# Patient Record
Sex: FEMALE | Race: BLACK OR AFRICAN AMERICAN | NOT HISPANIC OR LATINO | URBAN - METROPOLITAN AREA
[De-identification: names, ages, dates, MRNs, and addresses within clinical notes are randomized per-mention and may not be internally consistent; named-entity substitution may affect disease eponyms.]

---

## 2017-10-19 ENCOUNTER — TELEPHONE (OUTPATIENT)
Dept: OBSTETRICS AND GYNECOLOGY | Facility: CLINIC | Age: 27
End: 2017-10-19

## 2017-10-19 ENCOUNTER — NURSE TRIAGE (OUTPATIENT)
Dept: ADMINISTRATIVE | Facility: CLINIC | Age: 27
End: 2017-10-19

## 2017-10-19 NOTE — TELEPHONE ENCOUNTER
Reason for Disposition   [1] Recent medical visit within 24 hours AND [2] condition/symptoms SAME (unchanged) AND [3] caller has additional questions triager can answer (all triage questions negative)    Protocols used:  RECENT MEDICAL VISIT FOR ILLNESS FOLLOW-UP CALL-SAMSON Hill called scheduling desk to try to schedule appt for [facility] tomorrow as recommended by ED physician. She states she was having a lot of vaginal bleeding and contraction type pain and ED physician spoke to Dr. Car who advised to have patient come to clinic tomorrow to have have contents of pregnancy removed since patient is miscarrying. She denies fever, and she is still able to walk without feeling like she has to hold onto anything for support or as if she may pass out but she does report feeling weaker than usual. She is changing pads atleast once per hour and it has been that way since ED visit, she says maybe a little bit better. Advised per protocol if changing pads twice per hour and saturated to go to ED for evaluation or if she feels like she may fall/pass out from being dizzy/lightheaded to go to ED and have another adult to drive. She agrees to plan. Advised she can always call [facility] on call about any new or worsening symptoms and she agrees to plan.

## 2017-10-19 NOTE — TELEPHONE ENCOUNTER
Received a call from the [facility] ER via the transfer line regarding this patient.    26yo F, P-0000, with her first pregnancy.  She was seen in the [facility] ER with complaints of bleeding.  Per the [facility] ER doc, her HCG there was 3200 with no IUP but no ectopic demonstrated on u/s.  She presented today to the [facility] with continued complaints of bleeding.  HCG is 2570 (19.6% over about 24-hrs).  U/S demonstrates some amorphous material in the endometrium, mainly in the SHARYN, most likely an imcomplete Ab.  She is afebrile, and the remainder of her vitals were normal.  Her hematocrit is normal.  She is not bleeding heavily.  On exam, she was about 1-cm dilated.    I recommended d/c home with follow-up with me in clinic at [facility] tomorrow.  Address and phone number for [facility] was given.  Recommended that he give her strict pain, bleeding, and fever precautions.  Recommended NSAIDs for pain control.

## 2017-10-20 ENCOUNTER — TELEPHONE (OUTPATIENT)
Dept: OBSTETRICS AND GYNECOLOGY | Facility: CLINIC | Age: 27
End: 2017-10-20

## 2017-10-20 ENCOUNTER — SURGERY (OUTPATIENT)
Age: 27
End: 2017-10-20

## 2017-10-20 ENCOUNTER — ANESTHESIA EVENT (OUTPATIENT)
Dept: SURGERY | Facility: OTHER | Age: 27
End: 2017-10-20
Payer: MEDICAID

## 2017-10-20 ENCOUNTER — ANESTHESIA (OUTPATIENT)
Dept: SURGERY | Facility: OTHER | Age: 27
End: 2017-10-20
Payer: MEDICAID

## 2017-10-20 ENCOUNTER — HOSPITAL ENCOUNTER (OUTPATIENT)
Facility: OTHER | Age: 27
Discharge: HOME | End: 2017-10-20
Attending: EMERGENCY MEDICINE | Admitting: OBSTETRICS & GYNECOLOGY
Payer: MEDICAID

## 2017-10-20 VITALS
WEIGHT: 169 LBS | BODY MASS INDEX: 28.85 KG/M2 | OXYGEN SATURATION: 98 % | DIASTOLIC BLOOD PRESSURE: 67 MMHG | HEART RATE: 74 BPM | TEMPERATURE: 98 F | SYSTOLIC BLOOD PRESSURE: 111 MMHG | RESPIRATION RATE: 18 BRPM | HEIGHT: 64 IN

## 2017-10-20 DIAGNOSIS — O02.1 MISSED ABORTION: Primary | ICD-10-CM

## 2017-10-20 DIAGNOSIS — Z98.890 S/P DILATION AND CURETTAGE: ICD-10-CM

## 2017-10-20 LAB
ABO + RH BLD: NORMAL
ALBUMIN SERPL BCP-MCNC: 3.7 G/DL
ALP SERPL-CCNC: 50 U/L
ALT SERPL W/O P-5'-P-CCNC: 12 U/L
ANION GAP SERPL CALC-SCNC: 11 MMOL/L
AST SERPL-CCNC: 12 U/L
B-HCG UR QL: POSITIVE
BASOPHILS # BLD AUTO: 0.03 K/UL
BASOPHILS NFR BLD: 0.3 %
BILIRUB SERPL-MCNC: 0.5 MG/DL
BLD GP AB SCN CELLS X3 SERPL QL: NORMAL
BUN SERPL-MCNC: 4 MG/DL
CALCIUM SERPL-MCNC: 9.3 MG/DL
CHLORIDE SERPL-SCNC: 105 MMOL/L
CO2 SERPL-SCNC: 23 MMOL/L
CREAT SERPL-MCNC: 0.6 MG/DL
CTP QC/QA: YES
DIFFERENTIAL METHOD: ABNORMAL
EOSINOPHIL # BLD AUTO: 0.1 K/UL
EOSINOPHIL NFR BLD: 0.5 %
ERYTHROCYTE [DISTWIDTH] IN BLOOD BY AUTOMATED COUNT: 13.6 %
EST. GFR  (AFRICAN AMERICAN): >60 ML/MIN/1.73 M^2
EST. GFR  (NON AFRICAN AMERICAN): >60 ML/MIN/1.73 M^2
GLUCOSE SERPL-MCNC: 79 MG/DL
HCG INTACT+B SERPL-ACNC: 735 MIU/ML
HCT VFR BLD AUTO: 35.6 %
HGB BLD-MCNC: 11.7 G/DL
LYMPHOCYTES # BLD AUTO: 3.8 K/UL
LYMPHOCYTES NFR BLD: 35.8 %
MCH RBC QN AUTO: 27.5 PG
MCHC RBC AUTO-ENTMCNC: 32.9 G/DL
MCV RBC AUTO: 84 FL
MONOCYTES # BLD AUTO: 0.8 K/UL
MONOCYTES NFR BLD: 7.1 %
NEUTROPHILS # BLD AUTO: 6 K/UL
NEUTROPHILS NFR BLD: 55.9 %
PLATELET # BLD AUTO: 407 K/UL
PMV BLD AUTO: 9.1 FL
POTASSIUM SERPL-SCNC: 3.2 MMOL/L
PROT SERPL-MCNC: 7.4 G/DL
RBC # BLD AUTO: 4.25 M/UL
SODIUM SERPL-SCNC: 139 MMOL/L
WBC # BLD AUTO: 10.7 K/UL

## 2017-10-20 PROCEDURE — 80053 COMPREHEN METABOLIC PANEL: CPT

## 2017-10-20 PROCEDURE — 59820 CARE OF MISCARRIAGE: CPT | Mod: ,,, | Performed by: OBSTETRICS & GYNECOLOGY

## 2017-10-20 PROCEDURE — 37000008 HC ANESTHESIA 1ST 15 MINUTES: Performed by: OBSTETRICS & GYNECOLOGY

## 2017-10-20 PROCEDURE — 81025 URINE PREGNANCY TEST: CPT | Performed by: PHYSICIAN ASSISTANT

## 2017-10-20 PROCEDURE — 88305 TISSUE EXAM BY PATHOLOGIST: CPT | Performed by: PATHOLOGY

## 2017-10-20 PROCEDURE — 25000003 PHARM REV CODE 250: Performed by: PHYSICIAN ASSISTANT

## 2017-10-20 PROCEDURE — 84702 CHORIONIC GONADOTROPIN TEST: CPT

## 2017-10-20 PROCEDURE — 25000003 PHARM REV CODE 250: Performed by: NURSE ANESTHETIST, CERTIFIED REGISTERED

## 2017-10-20 PROCEDURE — 86900 BLOOD TYPING SEROLOGIC ABO: CPT

## 2017-10-20 PROCEDURE — 71000015 HC POSTOP RECOV 1ST HR: Performed by: OBSTETRICS & GYNECOLOGY

## 2017-10-20 PROCEDURE — 71000039 HC RECOVERY, EACH ADD'L HOUR: Performed by: OBSTETRICS & GYNECOLOGY

## 2017-10-20 PROCEDURE — 37000009 HC ANESTHESIA EA ADD 15 MINS: Performed by: OBSTETRICS & GYNECOLOGY

## 2017-10-20 PROCEDURE — 63600175 PHARM REV CODE 636 W HCPCS: Performed by: NURSE ANESTHETIST, CERTIFIED REGISTERED

## 2017-10-20 PROCEDURE — 71000033 HC RECOVERY, INTIAL HOUR: Performed by: OBSTETRICS & GYNECOLOGY

## 2017-10-20 PROCEDURE — 86901 BLOOD TYPING SEROLOGIC RH(D): CPT

## 2017-10-20 PROCEDURE — G0378 HOSPITAL OBSERVATION PER HR: HCPCS

## 2017-10-20 PROCEDURE — 36000704 HC OR TIME LEV I 1ST 15 MIN: Performed by: OBSTETRICS & GYNECOLOGY

## 2017-10-20 PROCEDURE — 71000016 HC POSTOP RECOV ADDL HR: Performed by: OBSTETRICS & GYNECOLOGY

## 2017-10-20 PROCEDURE — 25000003 PHARM REV CODE 250: Performed by: STUDENT IN AN ORGANIZED HEALTH CARE EDUCATION/TRAINING PROGRAM

## 2017-10-20 PROCEDURE — 36000705 HC OR TIME LEV I EA ADD 15 MIN: Performed by: OBSTETRICS & GYNECOLOGY

## 2017-10-20 PROCEDURE — 85025 COMPLETE CBC W/AUTO DIFF WBC: CPT

## 2017-10-20 PROCEDURE — 99285 EMERGENCY DEPT VISIT HI MDM: CPT | Mod: 25

## 2017-10-20 PROCEDURE — 88305 TISSUE EXAM BY PATHOLOGIST: CPT | Mod: 26,,, | Performed by: PATHOLOGY

## 2017-10-20 RX ORDER — ONDANSETRON HYDROCHLORIDE 2 MG/ML
INJECTION, SOLUTION INTRAMUSCULAR; INTRAVENOUS
Status: DISCONTINUED | OUTPATIENT
Start: 2017-10-20 | End: 2017-10-20

## 2017-10-20 RX ORDER — DOXYCYCLINE HYCLATE 100 MG
100 TABLET ORAL
Status: DISCONTINUED | OUTPATIENT
Start: 2017-10-20 | End: 2017-10-20 | Stop reason: HOSPADM

## 2017-10-20 RX ORDER — PROPOFOL 10 MG/ML
VIAL (ML) INTRAVENOUS
Status: DISCONTINUED | OUTPATIENT
Start: 2017-10-20 | End: 2017-10-20

## 2017-10-20 RX ORDER — ONDANSETRON 8 MG/1
8 TABLET, ORALLY DISINTEGRATING ORAL EVERY 8 HOURS PRN
Status: DISCONTINUED | OUTPATIENT
Start: 2017-10-20 | End: 2017-10-20 | Stop reason: HOSPADM

## 2017-10-20 RX ORDER — ACETAMINOPHEN 10 MG/ML
INJECTION, SOLUTION INTRAVENOUS
Status: DISCONTINUED | OUTPATIENT
Start: 2017-10-20 | End: 2017-10-20

## 2017-10-20 RX ORDER — DEXAMETHASONE SODIUM PHOSPHATE 4 MG/ML
INJECTION, SOLUTION INTRA-ARTICULAR; INTRALESIONAL; INTRAMUSCULAR; INTRAVENOUS; SOFT TISSUE
Status: DISCONTINUED | OUTPATIENT
Start: 2017-10-20 | End: 2017-10-20

## 2017-10-20 RX ORDER — DIPHENHYDRAMINE HCL 25 MG
25 CAPSULE ORAL EVERY 4 HOURS PRN
Status: DISCONTINUED | OUTPATIENT
Start: 2017-10-20 | End: 2017-10-20 | Stop reason: HOSPADM

## 2017-10-20 RX ORDER — IBUPROFEN 800 MG/1
800 TABLET ORAL EVERY 8 HOURS PRN
Qty: 30 TABLET | Refills: 0 | Status: SHIPPED | OUTPATIENT
Start: 2017-10-20

## 2017-10-20 RX ORDER — MIDAZOLAM HYDROCHLORIDE 1 MG/ML
INJECTION, SOLUTION INTRAMUSCULAR; INTRAVENOUS
Status: DISCONTINUED | OUTPATIENT
Start: 2017-10-20 | End: 2017-10-20

## 2017-10-20 RX ORDER — MISOPROSTOL 200 UG/1
200 TABLET ORAL 2 TIMES DAILY
Qty: 4 TABLET | Refills: 0 | Status: SHIPPED | OUTPATIENT
Start: 2017-10-20 | End: 2017-10-22

## 2017-10-20 RX ORDER — MEPERIDINE HYDROCHLORIDE 50 MG/ML
12.5 INJECTION INTRAMUSCULAR; INTRAVENOUS; SUBCUTANEOUS ONCE AS NEEDED
Status: DISCONTINUED | OUTPATIENT
Start: 2017-10-20 | End: 2017-10-20 | Stop reason: HOSPADM

## 2017-10-20 RX ORDER — ACETAMINOPHEN 500 MG
1000 TABLET ORAL
Status: COMPLETED | OUTPATIENT
Start: 2017-10-20 | End: 2017-10-20

## 2017-10-20 RX ORDER — OXYCODONE HYDROCHLORIDE 5 MG/1
5 TABLET ORAL
Status: DISCONTINUED | OUTPATIENT
Start: 2017-10-20 | End: 2017-10-20 | Stop reason: HOSPADM

## 2017-10-20 RX ORDER — MUPIROCIN 20 MG/G
OINTMENT TOPICAL
Status: DISCONTINUED | OUTPATIENT
Start: 2017-10-20 | End: 2017-10-20 | Stop reason: HOSPADM

## 2017-10-20 RX ORDER — DOXYCYCLINE HYCLATE 100 MG
100 TABLET ORAL EVERY 12 HOURS
Status: COMPLETED | OUTPATIENT
Start: 2017-10-20 | End: 2017-10-20

## 2017-10-20 RX ORDER — SODIUM CHLORIDE 0.9 % (FLUSH) 0.9 %
3 SYRINGE (ML) INJECTION
Status: DISCONTINUED | OUTPATIENT
Start: 2017-10-20 | End: 2017-10-20 | Stop reason: HOSPADM

## 2017-10-20 RX ORDER — LIDOCAINE HCL/PF 100 MG/5ML
SYRINGE (ML) INTRAVENOUS
Status: DISCONTINUED | OUTPATIENT
Start: 2017-10-20 | End: 2017-10-20

## 2017-10-20 RX ORDER — HYDROCODONE BITARTRATE AND ACETAMINOPHEN 5; 325 MG/1; MG/1
1 TABLET ORAL EVERY 6 HOURS PRN
COMMUNITY

## 2017-10-20 RX ORDER — ONDANSETRON 2 MG/ML
4 INJECTION INTRAMUSCULAR; INTRAVENOUS DAILY PRN
Status: DISCONTINUED | OUTPATIENT
Start: 2017-10-20 | End: 2017-10-20 | Stop reason: HOSPADM

## 2017-10-20 RX ORDER — CEPHALEXIN 500 MG/1
500 CAPSULE ORAL EVERY 6 HOURS
Status: ON HOLD | COMMUNITY
End: 2017-10-20 | Stop reason: HOSPADM

## 2017-10-20 RX ORDER — FENTANYL CITRATE 50 UG/ML
25 INJECTION, SOLUTION INTRAMUSCULAR; INTRAVENOUS EVERY 5 MIN PRN
Status: DISCONTINUED | OUTPATIENT
Start: 2017-10-20 | End: 2017-10-20 | Stop reason: HOSPADM

## 2017-10-20 RX ORDER — FENTANYL CITRATE 50 UG/ML
INJECTION, SOLUTION INTRAMUSCULAR; INTRAVENOUS
Status: DISCONTINUED | OUTPATIENT
Start: 2017-10-20 | End: 2017-10-20

## 2017-10-20 RX ORDER — HYDROCODONE BITARTRATE AND ACETAMINOPHEN 5; 325 MG/1; MG/1
1 TABLET ORAL EVERY 4 HOURS PRN
Status: DISCONTINUED | OUTPATIENT
Start: 2017-10-20 | End: 2017-10-20 | Stop reason: HOSPADM

## 2017-10-20 RX ORDER — DIPHENHYDRAMINE HYDROCHLORIDE 50 MG/ML
25 INJECTION INTRAMUSCULAR; INTRAVENOUS EVERY 4 HOURS PRN
Status: DISCONTINUED | OUTPATIENT
Start: 2017-10-20 | End: 2017-10-20 | Stop reason: HOSPADM

## 2017-10-20 RX ORDER — DOXYCYCLINE 100 MG/1
100 CAPSULE ORAL EVERY 12 HOURS
Qty: 14 CAPSULE | Refills: 1 | Status: SHIPPED | OUTPATIENT
Start: 2017-10-20 | End: 2017-10-27

## 2017-10-20 RX ORDER — HYDROMORPHONE HYDROCHLORIDE 2 MG/ML
0.4 INJECTION, SOLUTION INTRAMUSCULAR; INTRAVENOUS; SUBCUTANEOUS EVERY 5 MIN PRN
Status: DISCONTINUED | OUTPATIENT
Start: 2017-10-20 | End: 2017-10-20 | Stop reason: HOSPADM

## 2017-10-20 RX ORDER — SODIUM CHLORIDE, SODIUM LACTATE, POTASSIUM CHLORIDE, CALCIUM CHLORIDE 600; 310; 30; 20 MG/100ML; MG/100ML; MG/100ML; MG/100ML
INJECTION, SOLUTION INTRAVENOUS CONTINUOUS PRN
Status: DISCONTINUED | OUTPATIENT
Start: 2017-10-20 | End: 2017-10-20

## 2017-10-20 RX ADMIN — CARBOXYMETHYLCELLULOSE SODIUM 2 DROP: 2.5 SOLUTION/ DROPS OPHTHALMIC at 05:10

## 2017-10-20 RX ADMIN — IBUPROFEN 800 MG: 800 INJECTION INTRAVENOUS at 03:10

## 2017-10-20 RX ADMIN — DOXYCYCLINE 100 MG: 100 INJECTION, POWDER, LYOPHILIZED, FOR SOLUTION INTRAVENOUS at 04:10

## 2017-10-20 RX ADMIN — ONDANSETRON 4 MG: 2 INJECTION, SOLUTION INTRAMUSCULAR; INTRAVENOUS at 05:10

## 2017-10-20 RX ADMIN — MIDAZOLAM 2 MG: 1 INJECTION INTRAMUSCULAR; INTRAVENOUS at 03:10

## 2017-10-20 RX ADMIN — DOXYCYCLINE HYCLATE 100 MG: 100 TABLET, COATED ORAL at 07:10

## 2017-10-20 RX ADMIN — DEXAMETHASONE SODIUM PHOSPHATE 8 MG: 4 INJECTION, SOLUTION INTRAMUSCULAR; INTRAVENOUS at 05:10

## 2017-10-20 RX ADMIN — ACETAMINOPHEN 1000 MG: 500 TABLET ORAL at 12:10

## 2017-10-20 RX ADMIN — PROPOFOL 200 MG: 10 INJECTION, EMULSION INTRAVENOUS at 05:10

## 2017-10-20 RX ADMIN — ACETAMINOPHEN 1000 MG: 10 INJECTION, SOLUTION INTRAVENOUS at 05:10

## 2017-10-20 RX ADMIN — SODIUM CHLORIDE, SODIUM LACTATE, POTASSIUM CHLORIDE, AND CALCIUM CHLORIDE: 600; 310; 30; 20 INJECTION, SOLUTION INTRAVENOUS at 02:10

## 2017-10-20 RX ADMIN — FENTANYL CITRATE 100 MCG: 50 INJECTION, SOLUTION INTRAMUSCULAR; INTRAVENOUS at 05:10

## 2017-10-20 RX ADMIN — LIDOCAINE HYDROCHLORIDE 100 MG: 20 INJECTION, SOLUTION INTRAVENOUS at 05:10

## 2017-10-20 NOTE — TRANSFER OF CARE
"Anesthesia Transfer of Care Note    Patient: Sergio Candelario    Procedure(s) Performed: Procedure(s) (LRB):  D & C (SUCTION) (N/A)    Patient location: PACU    Anesthesia Type: general    Transport from OR: Transported from OR on 2-3 L/min O2 by NC with adequate spontaneous ventilation    Post pain: adequate analgesia    Post assessment: no apparent anesthetic complications and tolerated procedure well    Post vital signs: stable    Level of consciousness: awake, alert and oriented    Nausea/Vomiting: no nausea/vomiting    Complications: none    Transfer of care protocol was followed      Last vitals:   Visit Vitals  /64 (BP Location: Right arm, Patient Position: Lying)   Pulse 74   Temp 36.4 °C (97.6 °F) (Oral)   Resp 16   Ht 5' 4" (1.626 m)   Wt 76.7 kg (169 lb)   SpO2 100%   BMI 29.01 kg/m²     "

## 2017-10-20 NOTE — ED TRIAGE NOTES
Pt with miscarriage on 10/18 - was seen at another hospital on 10/19 and told she had retained products - told to follow up at a walk-in clinic - went there and told they were unable to help her so she needed to come to ED for further evaluation and possible D&C

## 2017-10-20 NOTE — SUBJECTIVE & OBJECTIVE
Obstetric History     No data available        Past Medical History:   Diagnosis Date    Endometriosis      History reviewed. No pertinent surgical history.      (Not in a hospital admission)    Review of patient's allergies indicates:  No Known Allergies     Family History     None        Social History Main Topics    Smoking status: Light Tobacco Smoker    Smokeless tobacco: Never Used    Alcohol use Yes    Drug use: Unknown    Sexual activity: Not on file     Review of Systems   Constitutional: Negative for activity change, chills and fever.   Respiratory: Negative for cough and shortness of breath.    Cardiovascular: Negative for leg swelling.   Gastrointestinal: Positive for abdominal pain (cramping). Negative for constipation, diarrhea, nausea and vomiting.   Genitourinary: Positive for vaginal bleeding. Negative for vaginal discharge.   Neurological: Negative for headaches.   Hematological: Does not bruise/bleed easily.      Objective:     Vital Signs (Most Recent):  Temp: 98.3 °F (36.8 °C) (10/20/17 1102)  Pulse: 88 (10/20/17 1214)  Resp: 18 (10/20/17 1214)  BP: 129/84 (10/20/17 1214)  SpO2: 97 % (10/20/17 1214) Vital Signs (24h Range):  Temp:  [98.3 °F (36.8 °C)] 98.3 °F (36.8 °C)  Pulse:  [88] 88  Resp:  [18] 18  SpO2:  [97 %-100 %] 97 %  BP: (129-138)/(82-84) 129/84     Weight: 76.7 kg (169 lb)  Body mass index is 29.01 kg/m².    No LMP recorded.    Physical Exam:   Constitutional: She is oriented to person, place, and time. She appears well-developed and well-nourished. No distress.    HENT:   Head: Normocephalic and atraumatic.      Cardiovascular: Normal rate, regular rhythm and normal heart sounds.     Pulmonary/Chest: Effort normal and breath sounds normal.        Abdominal: Soft. She exhibits no distension and no mass. There is no tenderness (central lower abdomen). There is no rebound and no guarding.     Genitourinary: Uterus normal. Cervix is normal.            Uterus Size: 11 cm    Musculoskeletal: Normal range of motion. She exhibits no edema.       Neurological: She is alert and oriented to person, place, and time.    Skin: Skin is warm and dry.    Psychiatric: She has a normal mood and affect.       Laboratory:  Beta HCG: No results for input(s): HCGPREGUR in the last 48 hours.  CBC:     Recent Labs  Lab 10/20/17  1221   WBC 10.70   RBC 4.25   HGB 11.7*   HCT 35.6*   *   MCV 84   MCH 27.5   MCHC 32.9

## 2017-10-20 NOTE — ED NOTES
AAOx4. States abd pain 8/10. Denies any other discomfort at this time. POC updated. Valuables and belongings sent home with terrie. Report called to JULIA Ceja (surgery holding nurse). Pt transported to holding area via stretcher by transport tech.

## 2017-10-20 NOTE — OP NOTE
OPERATIVE NOTE    DATE OF PROCEDURE: 10/20/2017    SURGEON: Daniel Sellers III, MD    ASSISTANT: Reyna Reyes MD    PREOPERATIVE DIAGNOSIS: missed AB    POSTOPERATIVE DIAGNOSIS: Same.     PROCEDURE: Suction Dilation and curettage.     ANESTHESIA: General    FINDINGS: cervix dilated to roughly 4cm. Large blood clot at cervical os. Removed with ringed forceps. Suction D&C evacuated the uterus. Uterus maintained firmness and hemostasis was noted.      ESTIMATED BLOOD LOSS: 150 mL.      SPECIMEN: products of conception.    INDICATIONS: Ultrasound performed in the ED at OSH confirmed a missed AB.  Patient was counseled extensively on medical and surgical options and opted for surgical management.     PROCEDURE IN DETAIL: After proper consents were explained and obtained, the   patient was taken to the Operating Room where anesthesia was obtained without difficulty. She was then placed in dorsal lithotomy position in Parviz stirrups. The patient was then prepped and draped in normal sterile fashion. A weighted speculum was placed into the vagina. Right angle used to visualize the cervix, which was grasped at the anterior lip with the single tooth tenaculum.  The cervix was already dilated. A large blood clot was removed from the cervical os. Products were then removed with ringed forceps. A 10 mm curved suction curette was introduced into the endometrial cavity and suction was applied to remove the products of conception. Serial sweeps were performed and products of conception were removed. Using a Leisa curette, serial sweeps were performed until a gritty consistency of the uterine lining was felt in all 4 quadrants. A final pass was performed with the suction curette to ensure that all remaining products of conception were removed.       The tenaculum was removed and good hemostasis was noted.  The patient tolerated the procedure well. All counts were correct x2. The patient was taken to Recovery area in stable  itzel VELEZ  PGY1    I was present for and participated in the entire surgical procedure.

## 2017-10-20 NOTE — DISCHARGE SUMMARY
"[facility]  Obstetrics & Gynecology  Discharge Summary    Patient Name: Sergio Candelario  MRN: [id number]  Admission Date: 10/20/2017  Hospital Length of Stay: 0 days  Discharge Date and Time:  10/20/2017 5:57 PM  Attending Physician: Hannah att. providers found   Discharging Provider: Reyna Reyes MD  Primary Care Provider: Primary Doctor Hannah    HPI:  26yo AAF . 2 days ago she states she went to [facility] with vaginal bleeding and was told she was having miscarriage. At that time vaginal bleeding was 2pads/hr per patient. She was discharged with a beta HCG of 3200. The next day she presented to [place] with continued vaginal bleeding and was told she had "products in there" and that "she needed to go to a clinic to get it out". Her beta HCG was 2570 (19% decline in 2 hours). The bleeding continued at 2 pads/hr up until this morning. Patient states she went through a pack of pads in 12 hours. Denies any passage of products. States she passed "like a black thing" possibly blood clots.No tan or grey material passed. This morning bleeding is lighter. Patient took an at home pregnancy test 3 weeks ago which was positive but did not go to the doctor. LMP end of July per patient. She didn't think anything of going 3 months without a period because she states "that happens a lot because of my endometriosis".   She complains of lower abdomen pain that is similar to strong menstrual cramps. Denies any fever, chills, nausea, vomiting, Cp, or SOB. Denies feeling light headed or dizziness. Denies LOC.      Hospital Course:  No notes on file    Procedure(s) (LRB):  D & C (SUCTION) (N/A)     Consults         Status Ordering Provider     Inpatient consult to Obstetrics / Gynecology  Once     Provider:  Daniel Sellers III, MD    Acknowledged OTILIA URBINA          Significant Diagnostic Studies: Labs:   CBC   Recent Labs  Lab 10/20/17  1221   WBC 10.70   HGB 11.7*   HCT 35.6*   * "       Pending Diagnostic Studies:     Procedure Component Value Units Date/Time    US OB Less Than 14 Wks with Transvag(xpd [[id number]] Updated:  10/20/17 3927    Order Status:  Sent Lab Status:  No result         Final Active Diagnoses:    Diagnosis Date Noted POA    PRINCIPAL PROBLEM:  S/P dilation and curettage [Z98.890] 10/20/2017 Not Applicable    Missed  [O02.1] 10/20/2017 Unknown      Problems Resolved During this Admission:    Diagnosis Date Noted Date Resolved POA        Discharged Condition: good    Disposition:     Follow Up:  Follow-up Information     Daniel Sellers Iii, MD. Schedule an appointment as soon as possible for a visit in 4 weeks.    Specialties:  Obstetrics, Obstetrics and Gynecology  Why:  post op visit  Contact information:  [address] [place] [address] [id number] [phone]                 Patient Instructions:     Diet general     Activity as tolerated     Other restrictions (specify):   Order Comments: Pelvic rest for 2 weeks     Call MD for:  temperature >100.4     Call MD for:  persistent nausea and vomiting     Call MD for:  severe uncontrolled pain     Call MD for:  difficulty breathing, headache or visual disturbances     Call MD for:  redness, tenderness, or signs of infection (pain, swelling, redness, odor or green/yellow discharge around incision site)     Call MD for:  persistent dizziness or light-headedness     Call MD for:  extreme fatigue     Call MD for:   Order Comments: Bleeding through 2 pads an hour for 2 hours       Medications:  Reconciled Home Medications:   Current Discharge Medication List      START taking these medications    Details   doxycycline (MONODOX) 100 MG capsule Take 1 capsule (100 mg total) by mouth every 12 (twelve) hours. MAY REFILL ONCE  Qty: 14 capsule, Refills: 1      ibuprofen (ADVIL,MOTRIN) 800 MG tablet Take 1 tablet (800 mg total) by mouth every 8 (eight) hours as needed for Pain.  Qty: 30 tablet, Refills: 0       misoprostol (CYTOTEC) 200 MCG Tab Take 1 tablet (200 mcg total) by mouth 2 (two) times daily.  Qty: 4 tablet, Refills: 0         CONTINUE these medications which have NOT CHANGED    Details   hydrocodone-acetaminophen 5-325mg (NORCO) 5-325 mg per tablet Take 1 tablet by mouth every 6 (six) hours as needed for Pain.         STOP taking these medications       cephALEXin (KEFLEX) 500 MG capsule Comments:   Reason for Stopping:               Reyna Reyes MD  Obstetrics & Gynecology  [facility]      I was present for and participated in the entire surgical procedure.

## 2017-10-20 NOTE — ANESTHESIA PREPROCEDURE EVALUATION
10/20/2017  Sergio Candelario is a 27 y.o., female.    Anesthesia Evaluation    I have reviewed the Patient Summary Reports.    I have reviewed the Nursing Notes.   I have reviewed the Medications.     Review of Systems  Anesthesia Hx:  No problems with previous Anesthesia    Social:  Non-Smoker    Cardiovascular:   Exercise tolerance: good    Pulmonary:  Pulmonary Normal    Hepatic/GI:  Hepatic/GI Normal    Endocrine:  Endocrine Normal        Physical Exam  General:  Obesity    Airway/Jaw/Neck:  Airway Findings: Mouth Opening: Normal Tongue: Normal  General Airway Assessment: Adult  Mallampati: II  TM Distance: Normal, at least 6 cm  Jaw/Neck Findings:     Neck ROM: Normal ROM      Dental:  Dental Findings: In tact             Anesthesia Plan  Type of Anesthesia, risks & benefits discussed:  Anesthesia Type:  general  Patient's Preference:   Intra-op Monitoring Plan:   Intra-op Monitoring Plan Comments:   Post Op Pain Control Plan:   Post Op Pain Control Plan Comments:   Induction:   IV  Beta Blocker:         Informed Consent: Patient understands risks and agrees with Anesthesia plan.  Questions answered. Anesthesia consent signed with patient.  ASA Score: 2     Day of Surgery Review of History & Physical:    H&P update referred to the surgeon.         Ready For Surgery From Anesthesia Perspective.

## 2017-10-20 NOTE — TELEPHONE ENCOUNTER
----- Message from Rhina Barros sent at 10/19/2017  5:28 PM CDT -----  Contact: Self [phone]  Pt is requesting a call back from the nurse to schedule a post hospital visit for a miscarriage.  Pt reports that she was told she must be seen tomorrow as she was told part of the baby is still inside.  Pt does not have insurance and I discussed the $500.00 deposit, but patient reports she does not have that and will need to discuss financial assistance.    Pt may be reached at [phone].    Thank you.  ROSANNA

## 2017-10-20 NOTE — H&P
"[facility]  Obstetrics & Gynecology  Progress Note    Patient Name: Sergio Candelario  MRN: [id number]  Admission Date: 10/20/2017  Primary Care Provider: Primary Doctor No  Principal Problem: Missed     Subjective:     HPI:  28yo AAF . 2 days ago she states she went to [facility] with vaginal bleeding and was told she was having miscarriage. At that time vaginal bleeding was 2pads/hr per patient. She was discharged with a beta HCG of 3200. The next day she presented to [place] with continued vaginal bleeding and was told she had "products in there" and that "she needed to go to a clinic to get it out". Her beta HCG was 2570 (19% decline in 2 hours). The bleeding continued at 2 pads/hr up until this morning. Patient states she went through a pack of pads in 12 hours. Denies any passage of products. States she passed "like a black thing" possibly blood clots.No tan or grey material passed. This morning bleeding is lighter. Patient took an at home pregnancy test 3 weeks ago which was positive but did not go to the doctor. LMP end of July per patient. She didn't think anything of going 3 months without a period because she states "that happens a lot because of my endometriosis".  GA 12wks by LMP. She complains of lower abdomen pain that is similar to strong menstrual cramps. Denies any fever, chills, nausea, vomiting, Cp, or SOB. Denies feeling light headed or dizziness. Denies LOC.          Obstetric History     No data available        Past Medical History:   Diagnosis Date    Endometriosis      History reviewed. No pertinent surgical history.      (Not in a hospital admission)    Review of patient's allergies indicates:  No Known Allergies     Family History     None        Social History Main Topics    Smoking status: Light Tobacco Smoker    Smokeless tobacco: Never Used    Alcohol use Yes    Drug use: Unknown    Sexual activity: Not on file     Review of Systems "   Constitutional: Negative for activity change, chills and fever.   Respiratory: Negative for cough and shortness of breath.    Cardiovascular: Negative for leg swelling.   Gastrointestinal: Positive for abdominal pain (cramping). Negative for constipation, diarrhea, nausea and vomiting.   Genitourinary: Positive for vaginal bleeding. Negative for vaginal discharge.   Neurological: Negative for headaches.   Hematological: Does not bruise/bleed easily.      Objective:     Vital Signs (Most Recent):  Temp: 98.3 °F (36.8 °C) (10/20/17 1102)  Pulse: 88 (10/20/17 1214)  Resp: 18 (10/20/17 1214)  BP: 129/84 (10/20/17 1214)  SpO2: 97 % (10/20/17 1214) Vital Signs (24h Range):  Temp:  [98.3 °F (36.8 °C)] 98.3 °F (36.8 °C)  Pulse:  [88] 88  Resp:  [18] 18  SpO2:  [97 %-100 %] 97 %  BP: (129-138)/(82-84) 129/84     Weight: 76.7 kg (169 lb)  Body mass index is 29.01 kg/m².    No LMP recorded.    Physical Exam:   Constitutional: She is oriented to person, place, and time. She appears well-developed and well-nourished. No distress.    HENT:   Head: Normocephalic and atraumatic.      Cardiovascular: Normal rate, regular rhythm and normal heart sounds.     Pulmonary/Chest: Effort normal and breath sounds normal.        Abdominal: Soft. She exhibits no distension and no mass. There is no tenderness (central lower abdomen). There is no rebound and no guarding.     Genitourinary: Uterus normal. Cervix is normal.            Uterus Size: 11 cm   Musculoskeletal: Normal range of motion. She exhibits no edema.       Neurological: She is alert and oriented to person, place, and time.    Skin: Skin is warm and dry.    Psychiatric: She has a normal mood and affect.       Laboratory:  Beta HC  Recent Labs  Lab 10/20/17  1221   WBC 10.70   RBC 4.25   HGB 11.7*   HCT 35.6*   *   MCV 84   MCH 27.5   MCHC 32.9         Assessment/Plan:     * Missed     - Beta HCG 3500>2570 over past 2 days  - Beta HCG here 735  - likely  missed AB  - speculum exam showed POC at cervical os. Could not remove 2/2 to patient's level of pain. Very uncomfortable during pelvic exam  - No need for repeat US since POC visualized  - H/H 11/35 stable  - VSS   - afebrile, no leukocytosis  - hemodynamically stable  - discussed expectant vs medical vs surgical management, patient wishes to proceed with suction D&C  - Risks and benefits discussed, surgery consent and blood consent signed  - doxycycline 100mg once on call to OR  - will proceed to OR for suction D&C  - discussed patient that we will have to establish OBYGN care and trend beta-HCG levels, will add to beta book             Reyna Reyes MD  Obstetrics & Gynecology  [facility]    I have reviewed the resident's note, and agree with the diagnosis and management plan

## 2017-10-20 NOTE — ANESTHESIA POSTPROCEDURE EVALUATION
"Anesthesia Post Evaluation    Patient: Sergio Candelario    Procedure(s) Performed: Procedure(s) (LRB):  D & C (SUCTION) (N/A)    Final Anesthesia Type: general  Patient location during evaluation: PACU  Patient participation: Yes- Able to Participate  Level of consciousness: oriented and awake  Post-procedure vital signs: reviewed and stable  Pain management: adequate  Airway patency: patent  PONV status at discharge: No PONV  Anesthetic complications: no      Cardiovascular status: hemodynamically stable  Respiratory status: unassisted, spontaneous ventilation and room air  Hydration status: euvolemic  Follow-up not needed.        Visit Vitals  /64 (BP Location: Right arm, Patient Position: Lying)   Pulse 74   Temp 36.4 °C (97.6 °F) (Oral)   Resp 16   Ht 5' 4" (1.626 m)   Wt 76.7 kg (169 lb)   SpO2 100%   BMI 29.01 kg/m²       Pain/Annetta Score: Pain Assessment Performed: Yes (10/20/2017  5:38 PM)  Presence of Pain: denies (10/20/2017  5:38 PM)  Pain Rating Prior to Med Admin: 8 (10/20/2017 12:25 PM)  Annetta Score: 8 (10/20/2017  5:38 PM)      "

## 2017-10-20 NOTE — HPI
"28yo AAF . 2 days ago she states she went to [facility] with vaginal bleeding and was told she was having miscarriage. At that time vaginal bleeding was 2pads/hr per patient. She was discharged with a beta HCG of 3200. The next day she presented to [place] with continued vaginal bleeding and was told she had "products in there" and that "she needed to go to a clinic to get it out". Her beta HCG was 2570 (19% decline in 2 hours). The bleeding continued at 2 pads/hr up until this morning. Patient states she went through a pack of pads in 12 hours. Denies any passage of products. States she passed "like a black thing" possibly blood clots.No tan or grey material passed. This morning bleeding is lighter. Patient took an at home pregnancy test 3 weeks ago which was positive but did not go to the doctor. LMP end of July per patient. She didn't think anything of going 3 months without a period because she states "that happens a lot because of my endometriosis".   She complains of lower abdomen pain that is similar to strong menstrual cramps. Denies any fever, chills, nausea, vomiting, Cp, or SOB. Denies feeling light headed or dizziness. Denies LOC.    "

## 2017-10-20 NOTE — TELEPHONE ENCOUNTER
Pt called earlier trying to get an appt with Dr. Romero-has been cramping and bleeding all night-pt very upset about having bills from three hospitals-Medicaid hasn't gone through yet-pt very tearful and crying about miscarriage-trying to console pt but explained to her that if she is bleeding that much changing 1-2 pads all night-she needs to go to the emergency room-said they went to two emergency rooms last night in [facility] and [place].  Both told her there was nothing they could do and not to come back-that she needed to see Dr. Romero-explained to pt that she needed to go to ER ([facility])-asked pt for anamariamalorie name and number (he had gone to get some money so that she could be seen)so I could call and discuss with him- I called terrie and discussed situation to take pt to [facility]- He confirmed as well they had been to two hospitals overnight and they did nothing to help them and told them not to come back-He said she was bleeding heavily all night- He verbalized understanding and said he will bring her to [facility]

## 2017-10-20 NOTE — TELEPHONE ENCOUNTER
Returned pt call and pt stated she was told to follow up with  for a SAB. Informed pt that she will need to pay a 500 deposit today. Pt stated that she did not have the money to pay. Gave patient the number to financial assistance. Informed pt that if she is having heavy bleeding that she should go to the ER. Pt verbalized understanding.

## 2017-10-20 NOTE — ASSESSMENT & PLAN NOTE
- Beta HCG 3500>2570 over past 2 days  - Beta HCG here 735  - likely missed AB  - speculum exam showed POC at cervical os. Could not remove 2/2 to patient's level of pain. Very uncomfortable during pelvic exam  - No need for repeat US since POC visualized  - H/H 11/35 stable  - VSS   - afebrile, no leukocytosis  - hemodynamically stable  - discussed expectant vs medical vs surgical management, patient wishes to proceed with suction D&C  - Risks and benefits discussed, surgery consent and blood consent signed  - doxycycline 100mg once on call to OR  - will proceed to OR for suction D&C  - discussed patient that we will have to establish [facility] care and trend beta-HCG levels, will add to beta book

## 2017-10-20 NOTE — LETTER
October 20, 2017         [address]  [place] [address]  Phone: [phone]  Fax: [phone]       Patient: Sergio Candelario   YOB: 1990  Date of Visit: 10/20/2017    To Whom It May Concern:    Da Candelario  was at [facility] on 10/20/2017. {HE SHE CAPITAL LETTER:18507} may return to work/school on *** {With/no:26582} restrictions. If you have any questions or concerns, or if I can be of further assistance, please do not hesitate to contact me.    Sincerely,    Rajni Lehman RN

## 2017-10-20 NOTE — ED PROVIDER NOTES
"Encounter Date: 10/20/2017       History     Chief Complaint   Patient presents with    Female  Problem     pt reports having miscarriage 2 days ago; reports still having lots of heavy bleeding and was told by OB-GYN to come to ED for further treatment; pt reports with c/o abdominal pain, last night went through 6 pads within 2-3 hours      27-year-old female with history of endometriosis presents the emergency department with complaints of vaginal bleeding and pregnancy.  She states that she's been having heavy bleeding for the last 2 days.  She admits that she was seen at 2 outside facility ERs and worked up for vaginal bleeding in pregnancy.  She states that she was told "there was nothing they could do."  She requests that we "stop the bleeding."  This is her first pregnancy.  She reports associated abdominal cramping that is an 8 out of 10. She states that she was advised to come here by GYN for further workup and treatment.  She reports heavy bleeding last night but states that today it is more consistent with "heavy period."      The history is provided by the patient and a significant other.     Review of patient's allergies indicates:  No Known Allergies  Past Medical History:   Diagnosis Date    Endometriosis      History reviewed. No pertinent surgical history.  History reviewed. No pertinent family history.  Social History   Substance Use Topics    Smoking status: Light Tobacco Smoker    Smokeless tobacco: Never Used    Alcohol use Yes     Review of Systems   Constitutional: Negative for chills and fever.   HENT: Negative for sore throat.    Respiratory: Negative for shortness of breath.    Cardiovascular: Negative for chest pain.   Gastrointestinal: Positive for abdominal pain. Negative for diarrhea, nausea and vomiting.   Genitourinary: Positive for vaginal bleeding. Negative for difficulty urinating, dysuria, flank pain, frequency, hematuria and urgency.   Musculoskeletal: Negative for back " pain.   Skin: Negative for rash.   Neurological: Negative for weakness.   Hematological: Does not bruise/bleed easily.       Physical Exam     Initial Vitals [10/20/17 1102]   BP Pulse Resp Temp SpO2   138/82 88 18 98.3 °F (36.8 °C) 100 %      MAP       100.67         Physical Exam    Nursing note and vitals reviewed.  Constitutional: Vital signs are normal. She appears well-developed and well-nourished. She is not diaphoretic.  Non-toxic appearance. No distress.   HENT:   Head: Normocephalic and atraumatic.   Right Ear: External ear normal.   Left Ear: External ear normal.   Nose: Nose normal.   Mouth/Throat: Oropharynx is clear and moist.   Eyes: Conjunctivae, EOM and lids are normal. Pupils are equal, round, and reactive to light. No scleral icterus.   Neck: Normal range of motion and phonation normal. Neck supple.   Cardiovascular: Normal rate, regular rhythm and normal heart sounds. Exam reveals no gallop and no friction rub.    No murmur heard.  Abdominal: Soft. Normal appearance and bowel sounds are normal. She exhibits no distension. There is tenderness in the suprapubic area. There is no rigidity, no rebound, no guarding, no CVA tenderness, no tenderness at McBurney's point and negative Negro's sign.   Genitourinary:   Genitourinary Comments: Deferred to GYN   Musculoskeletal: Normal range of motion.   No obvious deformities, moving all extremities, normal gait   Neurological: She is alert and oriented to person, place, and time. She has normal strength and normal reflexes. No sensory deficit.   Skin: Skin is warm, dry and intact. No lesion and no rash noted. No erythema.   Psychiatric: She has a normal mood and affect. Her speech is normal and behavior is normal. Judgment normal. Cognition and memory are normal.         ED Course   Procedures  Labs Reviewed   CBC W/ AUTO DIFFERENTIAL - Abnormal; Notable for the following:        Result Value    Hemoglobin 11.7 (*)     Hematocrit 35.6 (*)     Platelets 407  (*)     MPV 9.1 (*)     All other components within normal limits   COMPREHENSIVE METABOLIC PANEL - Abnormal; Notable for the following:     Potassium 3.2 (*)     BUN, Bld 4 (*)     Alkaline Phosphatase 50 (*)     All other components within normal limits   POCT URINE PREGNANCY - Abnormal; Notable for the following:     POC Preg Test, Ur Positive (*)     All other components within normal limits   HCG, QUANTITATIVE, PREGNANCY   TYPE & SCREEN         Imaging Results          US OB Less Than 14 Wks with Transvag(xpd (In process)                      Medical Decision Making:   History:   I obtained history from: someone other than patient.       <> Summary of History: terrie  Old Medical Records: I decided to obtain old medical records.  Initial Assessment:   27-year-old female with complaints consistent with missed .  Vital signs stable, afebrile, neurovascularly intact.  She is alert, healthy and nontoxic appearing.  She is tearful on exam.  She does have some suprapubic tenderness palpation on exam.  Clinical Tests:   Lab Tests: Ordered and Reviewed  Radiological Study: Ordered and Reviewed  ED Management:  2 days ago her beta HCG was 3248 at outside facility. Beta HCG yesterday was 2500. She does report passing some type of clot/product last night.  Workup obtained to further assess nature patient's bleeding.  No elevation in white blood cell count and H&H stable at 11.7 and 35.6.  11:37 AM GYN paged. 11:50 AM discussed with GYN. Will obtain US and labs.  Evaluated by OB/GYN, Dr. Reyes.  She states that products are visualized on this.  Actively passing products of conception however patient requesting D&C.  Plan to take patient to or for D&C per OB/GYN  Other:   I have discussed this case with another health care provider.       <> Summary of the Discussion: Tyra, attending ED physician  This note was created using Dragon Medical dictation.  There may be typographical errors secondary to  dictation.                     ED Course      Clinical Impression:     1. Missed         Disposition:   Disposition: Placed in Observation  Condition: Sergey Brooks PA-C  10/20/17 1257

## 2017-10-20 NOTE — TELEPHONE ENCOUNTER
----- Message from Essence Stapleton sent at 10/20/2017  8:53 AM CDT -----  Contact: PT  _  1st Request  _  2nd Request  _  3rd Request      Who:pt    Why: financial services wont help her unless she has an appointment,pt said she would like a call back ASAP    What Number to Call Back: [phone]    When to Expect a call back: (Before the end of the day)   -- if call after 3:00 call back will be tomorrow.

## 2017-10-21 NOTE — PLAN OF CARE
Sergio Candelario has met all discharge criteria from Phase II. Vital Signs are stable, ambulating  without difficulty. Discharge instructions given, patient verbalized understanding. Discharged from facility via wheelchair in stable condition.

## 2017-10-21 NOTE — DISCHARGE INSTRUCTIONS

## 2017-10-26 ENCOUNTER — TELEPHONE (OUTPATIENT)
Dept: OBSTETRICS AND GYNECOLOGY | Facility: HOSPITAL | Age: 27
End: 2017-10-26

## 2017-10-26 NOTE — TELEPHONE ENCOUNTER
Called and left a message regarding beta hcg levels. Stated patient needed to continue to have beta hcg levels drawn weekly. Stated GYN team could be reached by calling the [facility]  and asking for GYN team or calling [phone].

## 2017-10-30 ENCOUNTER — NURSE TRIAGE (OUTPATIENT)
Dept: ADMINISTRATIVE | Facility: CLINIC | Age: 27
End: 2017-10-30

## 2017-10-30 ENCOUNTER — HOSPITAL ENCOUNTER (EMERGENCY)
Facility: OTHER | Age: 27
Discharge: HOME | End: 2017-10-30
Attending: EMERGENCY MEDICINE
Payer: MEDICAID

## 2017-10-30 VITALS
RESPIRATION RATE: 18 BRPM | WEIGHT: 169 LBS | HEART RATE: 80 BPM | OXYGEN SATURATION: 98 % | BODY MASS INDEX: 28.85 KG/M2 | TEMPERATURE: 98 F | DIASTOLIC BLOOD PRESSURE: 80 MMHG | HEIGHT: 64 IN | SYSTOLIC BLOOD PRESSURE: 118 MMHG

## 2017-10-30 DIAGNOSIS — N93.9 VAGINAL BLEEDING: Primary | ICD-10-CM

## 2017-10-30 DIAGNOSIS — R10.2 PELVIC PAIN: ICD-10-CM

## 2017-10-30 LAB
ANION GAP SERPL CALC-SCNC: 7 MMOL/L
ANISOCYTOSIS BLD QL SMEAR: SLIGHT
B-HCG UR QL: NEGATIVE
BACTERIA #/AREA URNS HPF: ABNORMAL /HPF
BACTERIA #/AREA URNS HPF: NORMAL /HPF
BACTERIA GENITAL QL WET PREP: ABNORMAL
BASOPHILS NFR BLD: 0 %
BILIRUB UR QL STRIP: NEGATIVE
BILIRUB UR QL STRIP: NEGATIVE
BUN SERPL-MCNC: 9 MG/DL
CALCIUM SERPL-MCNC: 9.4 MG/DL
CHLORIDE SERPL-SCNC: 108 MMOL/L
CLARITY UR: ABNORMAL
CLARITY UR: CLEAR
CLUE CELLS VAG QL WET PREP: ABNORMAL
CO2 SERPL-SCNC: 26 MMOL/L
COLOR UR: YELLOW
COLOR UR: YELLOW
CREAT SERPL-MCNC: 0.7 MG/DL
CTP QC/QA: YES
DIFFERENTIAL METHOD: ABNORMAL
EOSINOPHIL NFR BLD: 0 %
ERYTHROCYTE [DISTWIDTH] IN BLOOD BY AUTOMATED COUNT: 14.6 %
EST. GFR  (AFRICAN AMERICAN): >60 ML/MIN/1.73 M^2
EST. GFR  (NON AFRICAN AMERICAN): >60 ML/MIN/1.73 M^2
FILAMENT FUNGI VAG WET PREP-#/AREA: ABNORMAL
GLUCOSE SERPL-MCNC: 81 MG/DL
GLUCOSE UR QL STRIP: NEGATIVE
GLUCOSE UR QL STRIP: NEGATIVE
HCT VFR BLD AUTO: 36.9 %
HGB BLD-MCNC: 11.7 G/DL
HGB UR QL STRIP: ABNORMAL
HGB UR QL STRIP: ABNORMAL
HYALINE CASTS #/AREA URNS LPF: 0 /LPF
HYALINE CASTS #/AREA URNS LPF: 0 /LPF
KETONES UR QL STRIP: NEGATIVE
KETONES UR QL STRIP: NEGATIVE
LEUKOCYTE ESTERASE UR QL STRIP: NEGATIVE
LEUKOCYTE ESTERASE UR QL STRIP: NEGATIVE
LYMPHOCYTES NFR BLD: 45 %
MCH RBC QN AUTO: 27.8 PG
MCHC RBC AUTO-ENTMCNC: 31.7 G/DL
MCV RBC AUTO: 88 FL
MICROSCOPIC COMMENT: ABNORMAL
MICROSCOPIC COMMENT: NORMAL
MONOCYTES NFR BLD: 2 %
MYELOCYTES NFR BLD MANUAL: 1 %
NEUTROPHILS NFR BLD: 51 %
NEUTS BAND NFR BLD MANUAL: 1 %
NITRITE UR QL STRIP: NEGATIVE
NITRITE UR QL STRIP: NEGATIVE
NON-SQ EPI CELLS #/AREA URNS HPF: 0 /HPF
NON-SQ EPI CELLS #/AREA URNS HPF: 10 /HPF
PH UR STRIP: 6 [PH] (ref 5–8)
PH UR STRIP: 6 [PH] (ref 5–8)
PLATELET # BLD AUTO: 473 K/UL
PLATELET BLD QL SMEAR: ABNORMAL
PMV BLD AUTO: 9.7 FL
POTASSIUM SERPL-SCNC: 4.3 MMOL/L
PROT UR QL STRIP: ABNORMAL
PROT UR QL STRIP: NEGATIVE
RBC # BLD AUTO: 4.21 M/UL
RBC #/AREA URNS HPF: 3 /HPF (ref 0–4)
RBC #/AREA URNS HPF: 50 /HPF (ref 0–4)
SODIUM SERPL-SCNC: 141 MMOL/L
SP GR UR STRIP: >=1.03 (ref 1–1.03)
SP GR UR STRIP: >=1.03 (ref 1–1.03)
SPECIMEN SOURCE: ABNORMAL
SQUAMOUS #/AREA URNS HPF: 2 /HPF
SQUAMOUS #/AREA URNS HPF: 25 /HPF
T VAGINALIS GENITAL QL WET PREP: ABNORMAL
URN SPEC COLLECT METH UR: ABNORMAL
URN SPEC COLLECT METH UR: ABNORMAL
UROBILINOGEN UR STRIP-ACNC: NEGATIVE EU/DL
UROBILINOGEN UR STRIP-ACNC: NEGATIVE EU/DL
WBC # BLD AUTO: 9.59 K/UL
WBC #/AREA URNS HPF: 2 /HPF (ref 0–5)
WBC #/AREA URNS HPF: 25 /HPF (ref 0–5)
WBC #/AREA VAG WET PREP: ABNORMAL
WBC CLUMPS URNS QL MICRO: ABNORMAL
WBC CLUMPS URNS QL MICRO: NORMAL
YEAST GENITAL QL WET PREP: ABNORMAL
YEAST URNS QL MICRO: ABNORMAL
YEAST URNS QL MICRO: NORMAL

## 2017-10-30 PROCEDURE — 81000 URINALYSIS NONAUTO W/SCOPE: CPT

## 2017-10-30 PROCEDURE — 25000003 PHARM REV CODE 250: Performed by: PHYSICIAN ASSISTANT

## 2017-10-30 PROCEDURE — 87591 N.GONORRHOEAE DNA AMP PROB: CPT

## 2017-10-30 PROCEDURE — 85007 BL SMEAR W/DIFF WBC COUNT: CPT

## 2017-10-30 PROCEDURE — 96361 HYDRATE IV INFUSION ADD-ON: CPT

## 2017-10-30 PROCEDURE — 81000 URINALYSIS NONAUTO W/SCOPE: CPT | Mod: 91

## 2017-10-30 PROCEDURE — 80048 BASIC METABOLIC PNL TOTAL CA: CPT

## 2017-10-30 PROCEDURE — 85027 COMPLETE CBC AUTOMATED: CPT

## 2017-10-30 PROCEDURE — 63600175 PHARM REV CODE 636 W HCPCS: Performed by: PHYSICIAN ASSISTANT

## 2017-10-30 PROCEDURE — 99284 EMERGENCY DEPT VISIT MOD MDM: CPT | Mod: 25

## 2017-10-30 PROCEDURE — 81025 URINE PREGNANCY TEST: CPT | Performed by: EMERGENCY MEDICINE

## 2017-10-30 PROCEDURE — 87210 SMEAR WET MOUNT SALINE/INK: CPT

## 2017-10-30 PROCEDURE — 96374 THER/PROPH/DIAG INJ IV PUSH: CPT

## 2017-10-30 RX ORDER — KETOROLAC TROMETHAMINE 30 MG/ML
15 INJECTION, SOLUTION INTRAMUSCULAR; INTRAVENOUS
Status: COMPLETED | OUTPATIENT
Start: 2017-10-30 | End: 2017-10-30

## 2017-10-30 RX ORDER — DOXYCYCLINE HYCLATE 100 MG
100 TABLET ORAL 2 TIMES DAILY
COMMUNITY

## 2017-10-30 RX ADMIN — KETOROLAC TROMETHAMINE 15 MG: 30 INJECTION, SOLUTION INTRAMUSCULAR at 02:10

## 2017-10-30 RX ADMIN — SODIUM CHLORIDE 1000 ML: 0.9 INJECTION, SOLUTION INTRAVENOUS at 02:10

## 2017-10-30 NOTE — ED PROVIDER NOTES
Encounter Date: 10/30/2017       History     Chief Complaint   Patient presents with    Vaginal Discharge     reports having a D&C done on 10/20 here and now reports having vaginal pain and dark red/black discharge. Reports having a UTI prior to the D&C.     Vaginal Pain     Patient is a 27-year-old female with endometriosis who presents to the ED with vaginal bleeding and vaginal discharge.  She is status post D&C on October 20 by Dr. Sellers.  She reports bleeding for a couple days after the procedure and then states is subsided. She states 3 days ago she began to have dark vaginal bleeding with moderate pelvic cramping.  She also reports vaginal irritation and possible brown vaginal discharge.  She states she noticed her urine looked dark today as well.  She denies fever.  She denies menorrhagia.  She states she is still taking doxycycline as indicated.  She also reports fatigue and lightheadedness.  She denies chest pain or shortness of breath.      The history is provided by the patient.     Review of patient's allergies indicates:  No Known Allergies  Past Medical History:   Diagnosis Date    Endometriosis      History reviewed. No pertinent surgical history.  History reviewed. No pertinent family history.  Social History   Substance Use Topics    Smoking status: Light Tobacco Smoker    Smokeless tobacco: Never Used    Alcohol use Yes     Review of Systems   Constitutional: Positive for fatigue. Negative for chills and fever.   HENT: Negative for congestion and sore throat.    Eyes: Negative for visual disturbance.   Respiratory: Negative for shortness of breath.    Cardiovascular: Negative for chest pain.   Gastrointestinal: Negative for abdominal pain, diarrhea, nausea and vomiting.   Genitourinary: Positive for pelvic pain, vaginal bleeding, vaginal discharge and vaginal pain. Negative for dysuria, frequency and hematuria.   Musculoskeletal: Negative for arthralgias.   Skin: Negative for rash.    Neurological: Positive for light-headedness. Negative for headaches.       Physical Exam     Initial Vitals [10/30/17 1139]   BP Pulse Resp Temp SpO2   129/85 90 18 98.6 °F (37 °C) 100 %      MAP       99.67         Physical Exam    Constitutional: Vital signs are normal. She is cooperative.   -American female in no acute distress.  She is nontoxic appearing.  She speaks in clear and full sentences.   HENT:   Head: Normocephalic and atraumatic.   Mouth/Throat: Oropharynx is clear and moist.   Eyes: EOM are normal. Pupils are equal, round, and reactive to light.   No conjunctival pallor.   Neck: Normal range of motion. Neck supple.   Cardiovascular: Normal rate, regular rhythm and intact distal pulses.   No murmur heard.  Pulmonary/Chest: Breath sounds normal. She has no wheezes. She has no rhonchi. She has no rales.   Abdominal: Soft. Bowel sounds are normal. There is no tenderness. There is no rebound and no guarding.   Genitourinary:   Genitourinary Comments: Normal appearance of the external genitalia, no skin lesions, erythema or masses. Pink vaginal mucosa. Cervix pink with no erythema.  Moderate amount of brown discharge noted.  No bleeding.  Cervical os is closed. No CMT, mild adnexal tenderness bilaterally    Musculoskeletal: Normal range of motion. She exhibits no edema.   Neurological: She is alert and oriented to person, place, and time. She has normal strength. No cranial nerve deficit. GCS eye subscore is 4. GCS verbal subscore is 5. GCS motor subscore is 6.   Skin: Skin is warm and dry. Capillary refill takes less than 2 seconds. No rash noted.   Psychiatric: She has a normal mood and affect. Her behavior is normal.         ED Course   Procedures  Labs Reviewed   URINALYSIS - Abnormal; Notable for the following:        Result Value    Appearance, UA Cloudy (*)     Specific Gravity, UA >=1.030 (*)     Protein, UA 1+ (*)     Occult Blood UA 3+ (*)     All other components within normal limits    URINALYSIS MICROSCOPIC - Abnormal; Notable for the following:     RBC, UA 50 (*)     WBC, UA 25 (*)     Bacteria, UA Few (*)     Non-Squam Epith 10 (*)     All other components within normal limits   CBC W/ AUTO DIFFERENTIAL   BASIC METABOLIC PANEL   VAGINAL SCREEN   POCT URINE PREGNANCY             Medical Decision Making:   Initial Assessment:   Urgent evaluation of a 27 y.o. female with a medical history recent D&C, presenting to the emergency department complaining of pelvic pain and vaginal discharge/bleeding. Patient is afebrile, nontoxic appearing and hemodynamically stable.  ED Management:  Patient is status post D&C 10 days ago.  She is reporting pelvic pain and vaginal bleeding.  Vital signs are stable.  I will give her IV fluid and Toradol for her pain.  UPT is negative.  Urinalysis does not reveal UTI.  Blood work does not reveal leukocytosis or electrolyte abnormalities.  Her H&H was stable and at baseline.  Pelvic exam reveals no hemorrhaging with a moderate amount of brown discharge.  Vaginal screen reveals occasional bacteria and no specific organisms.  I have explained to her that her symptoms are likely secondary to her recent procedure.  I have advised that she follow-up with Dr. Sellers regarding his symptoms.  I have advised her to take ibuprofen and Tylenol for her pain and to continue taking her doxycycline.  She is amenable to this plan.  She is stable for discharge.  Given her specific return precautions. I have discussed the patient with the attending thoroughly and he/she agrees to the treatment and plan.   Other:   I have discussed this case with another health care provider.  This note was created using Dragon Medical Dictation. There may be typographical errors secondary to dictation.                    ED Course      Clinical Impression:        1. Vaginal bleeding    2. Pelvic pain                             Carlos Emanuel PA-C  10/30/17 1515

## 2017-10-30 NOTE — ED TRIAGE NOTES
Pt reports center pelvic pain since D&C on 10/20. Pt reports dark discharge. Pt reports pain has worsened since D&C. Pt reports excessive fatigue and nausea. Pt reports she has a vaginal discharge that is very dark and with foul odor. Dr. Sellers performed sx. Denies any dysuria or fever/chills.

## 2017-10-30 NOTE — TELEPHONE ENCOUNTER
Reason for Disposition   Patient wants to be seen    Protocols used: ST VAGINAL [id number]    Pt calling with concerns of vaginal discharge. Pt had a D & C on 10/20/17.  Continues with dark brownish discharge and symptoms of bladder infection.  Advised Pt of [facility]-address given; Pt going to [facility] for lab work as well.

## 2017-10-31 LAB
C TRACH DNA SPEC QL NAA+PROBE: NOT DETECTED
N GONORRHOEA DNA SPEC QL NAA+PROBE: NOT DETECTED

## 2017-11-06 ENCOUNTER — TELEPHONE (OUTPATIENT)
Dept: OBSTETRICS AND GYNECOLOGY | Facility: HOSPITAL | Age: 27
End: 2017-11-06

## 2017-11-06 NOTE — TELEPHONE ENCOUNTER
Called and left a message regarding beta hcg levels. Stated patient needed to continue to have beta hcg levels drawn weekly. Stated GYN team could be reached by calling the [facility]  and asking for GYN team. Last Beta HCG was 24 on 10/30/17.

## 2017-11-15 ENCOUNTER — TELEPHONE (OUTPATIENT)
Dept: OBSTETRICS AND GYNECOLOGY | Facility: HOSPITAL | Age: 27
End: 2017-11-15

## 2017-11-15 NOTE — TELEPHONE ENCOUNTER
Called and left a message agian regarding beta hcg levels. Stated patient needed to continue to have beta hcg levels drawn weekly. Last level was 24 on 10/30/17 when she presented to the ED with vaginal bleeding after D&C. Patient had D&C for missed AB on 10/20/17.  Stated GYN team could be reached by calling the [facility]  and asking for GYN team or calling [phone].  Will send patient certified letter as 3rd attempt at trying to contact patient. Order for Beta-HCG in. Bleeding and pain precautions given in voicemail.

## (undated) DEVICE — SOL BETADINE 5%

## (undated) DEVICE — CONTAINER SPECIMEN STRL 4OZ

## (undated) DEVICE — SOL PVP-I SCRUB 7.5% 4OZ

## (undated) DEVICE — SOL 9P NACL IRR PIC IL

## (undated) DEVICE — VACURETTE 10MM CURVED

## (undated) DEVICE — JELLY KY LUBRICATING 5G PACKET

## (undated) DEVICE — PAD PREP 50/CA

## (undated) DEVICE — GLOVE BIOGEL SKINSENSE PI 7.5

## (undated) DEVICE — SEE MEDLINE ITEM 157110

## (undated) DEVICE — TRAY DRY SKIN SCRUB PREP

## (undated) DEVICE — SEE MEDLINE ITEM 152622

## (undated) DEVICE — Device

## (undated) DEVICE — SEE MEDLINE ITEM 153151